# Patient Record
Sex: MALE | Race: ASIAN | Employment: PART TIME
[De-identification: names, ages, dates, MRNs, and addresses within clinical notes are randomized per-mention and may not be internally consistent; named-entity substitution may affect disease eponyms.]

---

## 2024-03-05 ENCOUNTER — HOSPITAL ENCOUNTER (OUTPATIENT)
Facility: HOSPITAL | Age: 49
Setting detail: RECURRING SERIES
Discharge: HOME | End: 2024-03-08
Payer: COMMERCIAL

## 2024-03-05 PROCEDURE — 97161 PT EVAL LOW COMPLEX 20 MIN: CPT

## 2024-03-05 NOTE — THERAPY EVALUATION
JESSI Palomino, PT       3/5/2024       7:26 AM    Payor: ZARIA / Plan: [employer]IA [employer] [employer] / Product Type: *No Product type* /     No Physician signature required

## 2024-03-05 NOTE — PROGRESS NOTES
.   PHYSICAL / OCCUPATIONAL THERAPY - DAILY TREATMENT NOTE (updated )  For Eval visit    Patient Name: Tone Gonzales    Date: 3/5/2024    : 1975  Insurance: Payor: ZARIA / Plan: [employer]IA [place] [employer] / Product Type: *No Product type* /      Patient  verified yes     Visit #   Current / Total 1 12   Time   In / Out 11:03 11:48   Pain   In / Out 1/10 1/10   Subjective Functional Status/Changes: See POC     TREATMENT AREA =  Low back pain [M54.50]    OBJECTIVE           38 min   Eval - untimed                      Therapeutic Procedures:  Tx Min Billable or 1:1 Min (if diff from Tx Min) Procedure, Rationale, Specifics   7  76897 Self Care/Home Management (timed):  improve patient knowledge and understanding of pain reducing techniques, positioning, posture/ergonomics, home safety, activity modification, diagnosis/prognosis, and physical therapy expectations, procedures and progression  to improve patient's ability to progress to PLOF and address remaining functional goals.  (see flow sheet as applicable)     Details if applicable:    Reviewed diagnosis, prognosis, therapy progression   Reviewed and educated on HEP   Edu on hip hinge pattern, gradual return to weigh lifting low load high rep          Details if applicable:      []   assessing strength/ROM  []   assessing activity performance (ex: sit to stand, stair negotiation)  []   assessing balance/control (ex. Cherry, DGI, SLS)          Details if applicable:      []   assessing strength/ROM  []   assessing activity performance (ex: sit to stand, stair negotiation)  []   assessing balance/control (ex. Cherry, DGI, SLS)          Details if applicable:      []   assessing strength/ROM  []   assessing activity performance (ex: sit to stand, stair negotiation)  []   assessing balance/control (ex. Cherry, DGI, SLS)          Details if applicable:       7  [facility] Totals Reminder: bill using total billable min of TIMED therapeutic procedures (example: do not include

## 2024-03-20 ENCOUNTER — HOSPITAL ENCOUNTER (OUTPATIENT)
Facility: HOSPITAL | Age: 49
Setting detail: RECURRING SERIES
Discharge: HOME | End: 2024-03-23
Payer: COMMERCIAL

## 2024-03-20 PROCEDURE — 97112 NEUROMUSCULAR REEDUCATION: CPT

## 2024-03-20 PROCEDURE — 97110 THERAPEUTIC EXERCISES: CPT

## 2024-03-20 PROCEDURE — 97535 SELF CARE MNGMENT TRAINING: CPT

## 2024-03-20 PROCEDURE — 97140 MANUAL THERAPY 1/> REGIONS: CPT

## 2024-03-20 NOTE — PROGRESS NOTES
PHYSICAL / OCCUPATIONAL THERAPY - DAILY TREATMENT NOTE (updated )    Patient Name: Tone Gonzales    Date: 3/20/2024    : 1975  Insurance: Payor: PANAYSHA / Plan: [employer] [place] [employer]MO / Product Type: *No Product type* /      Patient  verified yes     Visit #   Current / Total 2 12   Time   In / Out 11:03 11:40   Pain   In / Out 0/10 1/10   Subjective Functional Status/Changes: Says the pain comes and goes. If resting too much or if he does too much, it gets sore. Says \"I get this feeling that I'm just not right. I've recovered a lot since it first started happening, but I'm still not back to normal.\"     TREATMENT AREA =  Low back pain [M54.50]    OBJECTIVE    Therapeutic Procedures:  Tx Min Billable or 1:1 Min (if diff from Tx Min) Procedure, Rationale, Specifics   8  20511 Self Care/Home Management (timed):  improve patient knowledge and understanding of pain reducing techniques, positioning, posture/ergonomics, home safety, activity modification, diagnosis/prognosis, and physical therapy expectations, procedures and progression  to improve patient's ability to progress to PLOF and address remaining functional goals.  (see flow sheet as applicable)     Details if applicable:  discussed fear avoidance, getting back into gym eventually, and goals of self management of symptoms without fear of reinjury every time he  a pencil from the ground   9  80313 Manual Therapy (timed):  decrease pain, increase tissue extensibility, and decrease trigger points to improve patient's ability to progress to PLOF and address remaining functional goals.  The manual therapy interventions were performed at a separate and distinct time from the therapeutic activities interventions . (see flow sheet as applicable)       Details if applicable:  DTM along L QL and glute med   10   16359 Neuromuscular Re-Education (timed):  improve balance, coordination, kinesthetic sense, posture, core stability and proprioception to

## 2024-03-26 ENCOUNTER — HOSPITAL ENCOUNTER (OUTPATIENT)
Facility: HOSPITAL | Age: 49
Setting detail: RECURRING SERIES
Discharge: HOME | End: 2024-03-29
Payer: COMMERCIAL

## 2024-03-26 PROCEDURE — 97112 NEUROMUSCULAR REEDUCATION: CPT

## 2024-03-26 PROCEDURE — 97535 SELF CARE MNGMENT TRAINING: CPT

## 2024-03-26 PROCEDURE — 97140 MANUAL THERAPY 1/> REGIONS: CPT

## 2024-03-26 PROCEDURE — 97110 THERAPEUTIC EXERCISES: CPT

## 2024-03-26 NOTE — PROGRESS NOTES
PHYSICAL / OCCUPATIONAL THERAPY - DAILY TREATMENT NOTE (updated )    Patient Name: Tone Gonzales    Date: 3/26/2024    : 1975  Insurance: Payor: PANAYSHA / Plan: ZARIA [place] HMO / Product Type: *No Product type* /      Patient  verified yes     Visit #   Current / Total 2 12   Time   In / Out 11:03 11:44   Pain   In / Out 0-1/10  0-1/10   Subjective Functional Status/Changes: The pain comes and goes. Towards the evening the pain feels like it causes him to want to rest and take it easy. By the morning, it feels better with movement.   Right now it is feeling pretty good.     Getting the leg symptoms less in the L LE    Has not been doing the exercises too much. Still swimming for like an hour.      TREATMENT AREA =  Low back pain [M54.50]    OBJECTIVE    Therapeutic Procedures:  Tx Min Billable or 1:1 Min (if diff from Tx Min) Procedure, Rationale, Specifics   8  65346 Self Care/Home Management (timed):  improve patient knowledge and understanding of pain reducing techniques, positioning, posture/ergonomics, home safety, activity modification, diagnosis/prognosis, and physical therapy expectations, procedures and progression  to improve patient's ability to progress to PLOF and address remaining functional goals.  (see flow sheet as applicable)     Details if applicable:  discussed fear avoidance, getting back into gym eventually, and goals of self management of symptoms   Edu on doing the HEP to address last bit of L LE symptoms and proactively address back issue   8  46448 Manual Therapy (timed):  decrease pain, increase tissue extensibility, and decrease trigger points to improve patient's ability to progress to PLOF and address remaining functional goals.  The manual therapy interventions were performed at a separate and distinct time from the therapeutic activities interventions . (see flow sheet as applicable)       Details if applicable:  DTM along LS para in prone   12   35114 Neuromuscular

## 2024-04-03 ENCOUNTER — HOSPITAL ENCOUNTER (OUTPATIENT)
Facility: HOSPITAL | Age: 49
Setting detail: RECURRING SERIES
Discharge: HOME | End: 2024-04-06
Payer: COMMERCIAL

## 2024-04-03 PROCEDURE — 97535 SELF CARE MNGMENT TRAINING: CPT

## 2024-04-03 PROCEDURE — 97110 THERAPEUTIC EXERCISES: CPT

## 2024-04-03 PROCEDURE — 97112 NEUROMUSCULAR REEDUCATION: CPT

## 2024-04-03 NOTE — PROGRESS NOTES
PHYSICAL / OCCUPATIONAL THERAPY - DAILY TREATMENT NOTE (updated )    Patient Name: Tone Gonzales    Date: 4/3/2024    : 1975  Insurance: Payor: PANAYSHA / Plan: ZARIA [place] HMO / Product Type: *No Product type* /      Patient  verified yes     Visit #   Current / Total 4 12   Time   In / Out 11:03 11:48   Pain   In / Out 0-110 0-1/10   Subjective Functional Status/Changes: See PN     TREATMENT AREA =  Low back pain [M54.50]    OBJECTIVE    Therapeutic Procedures:  Tx Min Billable or 1:1 Min (if diff from Tx Min) Procedure, Rationale, Specifics   23  66547 Self Care/Home Management (timed):  improve patient knowledge and understanding of pain reducing techniques, positioning, posture/ergonomics, home safety, activity modification, diagnosis/prognosis, and physical therapy expectations, procedures and progression  to improve patient's ability to progress to PLOF and address remaining functional goals.  (see flow sheet as applicable)     Details if applicable:  discussed fear avoidance, getting back into gym eventually, and goals of self management of symptoms     Provided comprehensive HEP for pt to work on 2-3x per week and return in 2 weeks with update     67012 Manual Therapy (timed):  decrease pain, increase tissue extensibility, and decrease trigger points to improve patient's ability to progress to PLOF and address remaining functional goals.  The manual therapy interventions were performed at a separate and distinct time from the therapeutic activities interventions . (see flow sheet as applicable)       Details if applicable:  DTM along LS para in prone   8   11015 Therapeutic Activity (timed):  use of dynamic activities replicating functional movements to increase ROM, strength, coordination, balance, and proprioception in order to improve patient's ability to progress to PLOF and address remaining functional goals.  (see flow sheet as applicable)     Details if applicable:    Review of HEP

## 2024-04-03 NOTE — THERAPY RECERTIFICATION
4/3/24.    Frequency / Duration:   Patient to be seen   1-2   times per week for   2-4    WEEKS    RECOMMENDATIONS  We would like to continue therapy for progress to goals stated above.  Continue therapy with changes to Plan of Care to include: frequency of PT per week    If you have any questions/comments please contact us directly.  Thank you for allowing us to assist in the care of your patient.    Adria Palomino, PT       4/3/2024       9:48 AM

## 2024-04-19 ENCOUNTER — HOSPITAL ENCOUNTER (OUTPATIENT)
Facility: HOSPITAL | Age: 49
Setting detail: RECURRING SERIES
Discharge: HOME | End: 2024-04-22
Payer: COMMERCIAL

## 2024-04-19 PROCEDURE — 97140 MANUAL THERAPY 1/> REGIONS: CPT

## 2024-04-19 PROCEDURE — 97535 SELF CARE MNGMENT TRAINING: CPT

## 2024-04-19 PROCEDURE — 97110 THERAPEUTIC EXERCISES: CPT

## 2024-04-19 NOTE — PROGRESS NOTES
PHYSICAL / OCCUPATIONAL THERAPY - DAILY TREATMENT NOTE (updated )    Patient Name: Tone Gonzales    Date: 2024    : 1975  Insurance: Payor: ZARIA / Plan: ZARIA [place] HMO / Product Type: *No Product type* /      Patient  verified yes     Visit #   Current / Total 5 12   Time   In / Out 1:00 1:45   Pain   In / Out 2/10  0-1/10   Subjective Functional Status/Changes: Took his son to one if theses \"kids gyms\" and did something to his lower back. And starting that night to now, any bending motion is sore achy and stiff and feeling more pain down in the L LE down to the calf. Before , he had another injection and even prior to that he was feeling much better and more confident with lifting his son.     Now sitting, feeling some uncomfortable feeling in the L leg. Over the past 2-3 days, getting more pops in the lower back.      TREATMENT AREA =  Low back pain [M54.50]    OBJECTIVE    Therapeutic Procedures:  Tx Min Billable or 1:1 Min (if diff from Tx Min) Procedure, Rationale, Specifics   15  [id number] Self Care/Home Management (timed):  improve patient knowledge and understanding of pain reducing techniques, positioning, posture/ergonomics, home safety, activity modification, diagnosis/prognosis, and physical therapy expectations, procedures and progression  to improve patient's ability to progress to PLOF and address remaining functional goals.  (see flow sheet as applicable)     Details if applicable:  discussed acute sxs management via directional preference exercises for extension, nerve glides, and LAX ball to glut     15  02705 Manual Therapy (timed):  decrease pain, increase tissue extensibility, and decrease trigger points to improve patient's ability to progress to PLOF and address remaining functional goals.  The manual therapy interventions were performed at a separate and distinct time from the therapeutic activities interventions . (see flow sheet as applicable)       Details if

## 2024-05-03 ENCOUNTER — HOSPITAL ENCOUNTER (OUTPATIENT)
Facility: HOSPITAL | Age: 49
Setting detail: RECURRING SERIES
Discharge: HOME | End: 2024-05-06
Payer: COMMERCIAL

## 2024-05-03 PROCEDURE — 97110 THERAPEUTIC EXERCISES: CPT

## 2024-05-03 PROCEDURE — 97530 THERAPEUTIC ACTIVITIES: CPT

## 2024-05-03 NOTE — THERAPY DISCHARGE
PT DISCHARGE DAILY NOTE AND SUMMARY     Date:5/3/2024  Patient name: Tone Gonzales Start of Care: 3/5/24   Referral source: Wendy Vigil PA-C : 1975   Medical/Treatment Diagnosis: Low back pain [M54.50] Onset Date:23     Prior Hospitalization: see medical history Provider#: [id number]   Comorbidities: none  Prior Level of Function:IND all ADLs  Insurance: Payor: [facility] / Plan: [facility] HMO / Product Type: *No Product type* /      Visits from Start of Care: 6 Missed Visits: 0    non-Medicare    Patient  verified yes     Visit #   Current / Total 6 12   Time   In / Out 11:50 12:15   Pain   In / Out 0/10 0/10   Subjective Functional Status/Changes: Strangely feeling better today. Feeling it more in the L lower back and the hip. Maybe a little less in the calf. Still the worst at night time when bathing his son. Still doing the pool and that helps a lot.      TREATMENT AREA =  Low back pain [M54.50]    OBJECTIVE         Therapeutic Procedures:    Tx Min Billable or 1:1 Min (if diff from Tx Min) Procedure, Rationale, Specifics   15  [id number] Therapeutic Exercise (timed):  increase ROM, strength, coordination, balance, and proprioception to improve patient's ability to progress to PLOF and address remaining functional goals. (see flow sheet as applicable)     Details if applicable:    Discussion of management of sxs and progression to gym exercises.     [x]   assessing strength/ROM  []   assessing activity performance (ex: sit to stand, stair negotiation)  []   assessing balance/control (ex. Cherry, DGI, SLS)     10  49131 Therapeutic Activity (timed):  use of dynamic activities replicating functional movements to increase ROM, strength, coordination, balance, and proprioception in order to improve patient's ability to progress to PLOF and address remaining functional goals.  (see flow sheet as applicable)     Details if applicable:      []   assessing strength/ROM  [x]   assessing activity